# Patient Record
Sex: FEMALE | Race: WHITE | NOT HISPANIC OR LATINO | Employment: OTHER | URBAN - METROPOLITAN AREA
[De-identification: names, ages, dates, MRNs, and addresses within clinical notes are randomized per-mention and may not be internally consistent; named-entity substitution may affect disease eponyms.]

---

## 2022-06-04 ENCOUNTER — TELEPHONE ENCOUNTER (OUTPATIENT)
Dept: URBAN - METROPOLITAN AREA CLINIC 68 | Facility: CLINIC | Age: 77
End: 2022-06-04

## 2022-06-05 ENCOUNTER — TELEPHONE ENCOUNTER (OUTPATIENT)
Dept: URBAN - METROPOLITAN AREA CLINIC 68 | Facility: CLINIC | Age: 77
End: 2022-06-05

## 2022-06-05 RX ORDER — HYDROCORTISONE ACETATE 0.5 %
GLUCOSAMINE CHONDR 1500 COMPLX(  ORAL  DAILY ) ACTIVE -HX ENTRY CREAM (GRAM) TOPICAL DAILY
Status: ACTIVE | COMMUNITY
Start: 2012-12-03

## 2022-06-05 RX ORDER — BIOTIN 5 MG
BIOTIN 5000( 5MG ORAL  DAILY ) ACTIVE -HX ENTRY CAPSULE ORAL DAILY
Status: ACTIVE | COMMUNITY
Start: 2012-12-03

## 2022-06-05 RX ORDER — ACETAMINOPHEN 160 MG/5ML
CRANBERRY EXTRACT( 250MG ORAL  DAILY ) ACTIVE -HX ENTRY SUSPENSION, ORAL (FINAL DOSE FORM) ORAL DAILY
Status: ACTIVE | COMMUNITY
Start: 2012-12-03

## 2022-06-05 RX ORDER — UBIDECARENONE/VIT E ACET 100MG-5
COQ10( 100MG ORAL  DAILY ) ACTIVE -HX ENTRY CAPSULE ORAL DAILY
Status: ACTIVE | COMMUNITY
Start: 2012-12-03

## 2022-06-05 RX ORDER — MULTIVIT-MIN/FOLIC/VIT K/LYCOP 400-300MCG
VITAMIN C( 1000MG ORAL  DAILY ) ACTIVE -HX ENTRY TABLET ORAL DAILY
Status: ACTIVE | COMMUNITY
Start: 2012-12-03

## 2022-06-05 RX ORDER — POLYETHYLENE GLYCOL 3350, SODIUM SULFATE, SODIUM CHLORIDE, POTASSIUM CHLORIDE, ASCORBIC ACID, SODIUM ASCORBATE 7.5-2.691G
KIT ORAL
Qty: 1 | Refills: 0 | Status: ACTIVE | COMMUNITY
Start: 2012-12-03

## 2022-06-05 RX ORDER — MULTIVITAMIN
MULTIPLE VITAMIN(  ORAL  DAILY ) ACTIVE -HX ENTRY TABLET ORAL DAILY
Status: ACTIVE | COMMUNITY
Start: 2012-12-03

## 2022-06-05 RX ORDER — ATENOLOL 25 MG/1
TENORMIN( 25MG ORAL  DAILY ) ACTIVE -HX ENTRY TABLET ORAL DAILY
Status: ACTIVE | COMMUNITY
Start: 2012-12-03

## 2022-06-25 ENCOUNTER — TELEPHONE ENCOUNTER (OUTPATIENT)
Age: 77
End: 2022-06-25

## 2022-06-26 ENCOUNTER — TELEPHONE ENCOUNTER (OUTPATIENT)
Age: 77
End: 2022-06-26

## 2022-06-26 RX ORDER — ATENOLOL 25 MG/1
TENORMIN( 25MG ORAL  DAILY ) ACTIVE -HX ENTRY TABLET ORAL DAILY
Status: ACTIVE | COMMUNITY
Start: 2012-12-03

## 2022-06-26 RX ORDER — ACETAMINOPHEN 160 MG/5ML
CRANBERRY EXTRACT( 250MG ORAL  DAILY ) ACTIVE -HX ENTRY SUSPENSION, ORAL (FINAL DOSE FORM) ORAL DAILY
Status: ACTIVE | COMMUNITY
Start: 2012-12-03

## 2022-06-26 RX ORDER — UBIDECARENONE/VIT E ACET 100MG-5
COQ10( 100MG ORAL  DAILY ) ACTIVE -HX ENTRY CAPSULE ORAL DAILY
Status: ACTIVE | COMMUNITY
Start: 2012-12-03

## 2022-06-26 RX ORDER — POLYETHYLENE GLYCOL 3350, SODIUM SULFATE, SODIUM CHLORIDE, POTASSIUM CHLORIDE, ASCORBIC ACID, SODIUM ASCORBATE 7.5-2.691G
KIT ORAL
Qty: 1 | Refills: 0 | Status: ACTIVE | COMMUNITY
Start: 2012-12-03

## 2022-06-26 RX ORDER — MULTIVIT-MIN/FOLIC/VIT K/LYCOP 400-300MCG
VITAMIN C( 1000MG ORAL  DAILY ) ACTIVE -HX ENTRY TABLET ORAL DAILY
Status: ACTIVE | COMMUNITY
Start: 2012-12-03

## 2022-06-26 RX ORDER — FLUPHENAZINE HCL 1 MG
GLUCOSAMINE CHONDR 1500 COMPLX(  ORAL  DAILY ) ACTIVE -HX ENTRY TABLET ORAL DAILY
Status: ACTIVE | COMMUNITY
Start: 2012-12-03

## 2022-06-26 RX ORDER — CALCIUM CARBONATE/VITAMIN D3 600 MG-10
CALCIUM + D( 150-261-330MG-MG-IU ORAL  DAILY ) ACTIVE -HX ENTRY TABLET ORAL DAILY
Status: ACTIVE | COMMUNITY
Start: 2012-12-03

## 2023-10-04 ENCOUNTER — WEB ENCOUNTER (OUTPATIENT)
Dept: URBAN - METROPOLITAN AREA CLINIC 68 | Facility: CLINIC | Age: 78
End: 2023-10-04

## 2023-10-05 ENCOUNTER — LAB OUTSIDE AN ENCOUNTER (OUTPATIENT)
Dept: URBAN - METROPOLITAN AREA CLINIC 66 | Facility: CLINIC | Age: 78
End: 2023-10-05

## 2023-10-05 ENCOUNTER — WEB ENCOUNTER (OUTPATIENT)
Dept: URBAN - METROPOLITAN AREA CLINIC 66 | Facility: CLINIC | Age: 78
End: 2023-10-05

## 2023-10-05 ENCOUNTER — OFFICE VISIT (OUTPATIENT)
Dept: URBAN - METROPOLITAN AREA CLINIC 66 | Facility: CLINIC | Age: 78
End: 2023-10-05
Payer: MEDICARE

## 2023-10-05 VITALS
OXYGEN SATURATION: 97 % | WEIGHT: 130 LBS | HEART RATE: 78 BPM | HEIGHT: 65 IN | BODY MASS INDEX: 21.66 KG/M2 | DIASTOLIC BLOOD PRESSURE: 82 MMHG | SYSTOLIC BLOOD PRESSURE: 122 MMHG

## 2023-10-05 DIAGNOSIS — K21.9 GASTROESOPHAGEAL REFLUX DISEASE WITHOUT ESOPHAGITIS: ICD-10-CM

## 2023-10-05 DIAGNOSIS — Z12.11 COLON CANCER SCREENING: ICD-10-CM

## 2023-10-05 DIAGNOSIS — R10.13 DYSPEPSIA: ICD-10-CM

## 2023-10-05 DIAGNOSIS — Z86.010 HISTORY OF COLON POLYPS: ICD-10-CM

## 2023-10-05 PROBLEM — 305058001: Status: ACTIVE | Noted: 2023-10-05

## 2023-10-05 PROCEDURE — 99204 OFFICE O/P NEW MOD 45 MIN: CPT | Performed by: INTERNAL MEDICINE

## 2023-10-05 RX ORDER — ATENOLOL 25 MG/1
TENORMIN( 25MG ORAL  DAILY ) ACTIVE -HX ENTRY TABLET ORAL DAILY
Status: DISCONTINUED | COMMUNITY
Start: 2012-12-03

## 2023-10-05 RX ORDER — POLYETHYLENE GLYCOL 3350, SODIUM SULFATE, SODIUM CHLORIDE, POTASSIUM CHLORIDE, ASCORBIC ACID, SODIUM ASCORBATE 7.5-2.691G
KIT ORAL
Qty: 1 | Refills: 0 | Status: DISCONTINUED | COMMUNITY
Start: 2012-12-03

## 2023-10-05 RX ORDER — UBIDECARENONE/VIT E ACET 100MG-5
COQ10( 100MG ORAL  DAILY ) ACTIVE -HX ENTRY CAPSULE ORAL DAILY
Status: ACTIVE | COMMUNITY
Start: 2012-12-03

## 2023-10-05 RX ORDER — CALCIUM CARBONATE/VITAMIN D3 600 MG-10
CALCIUM + D( 150-261-330MG-MG-IU ORAL  DAILY ) ACTIVE -HX ENTRY TABLET ORAL DAILY
Status: DISCONTINUED | COMMUNITY
Start: 2012-12-03

## 2023-10-05 RX ORDER — SOD SULF/POT CHLORIDE/MAG SULF 1.479 G
12 TABLETS THE FIRST DOSE THE EVENING BEFORE AND SECOND DOSE THE MORNING OF COLONOSCOPY TABLET ORAL TWICE A DAY
Qty: 24 | OUTPATIENT
Start: 2023-10-05 | End: 2023-10-06

## 2023-10-05 RX ORDER — FLUPHENAZINE HCL 1 MG
GLUCOSAMINE CHONDR 1500 COMPLX(  ORAL  DAILY ) ACTIVE -HX ENTRY TABLET ORAL DAILY
Status: ACTIVE | COMMUNITY
Start: 2012-12-03

## 2023-10-05 RX ORDER — ACETAMINOPHEN 160 MG/5ML
CRANBERRY EXTRACT( 250MG ORAL  DAILY ) ACTIVE -HX ENTRY SUSPENSION, ORAL (FINAL DOSE FORM) ORAL DAILY
Status: ACTIVE | COMMUNITY
Start: 2012-12-03

## 2023-10-05 RX ORDER — MULTIVIT-MIN/FOLIC/VIT K/LYCOP 400-300MCG
VITAMIN C( 1000MG ORAL  DAILY ) ACTIVE -HX ENTRY TABLET ORAL DAILY
Status: ACTIVE | COMMUNITY
Start: 2012-12-03

## 2023-10-05 RX ORDER — ASCORBIC ACID 1000 MG
1 CAPSULE TABLET ORAL ONCE A DAY
Status: ACTIVE | COMMUNITY

## 2023-10-05 NOTE — HPI-TODAY'S VISIT:
78 y.o. WF with a history of GERD and a personal history of colon polyps who is here for evaluation of dyspepsia and reflux. She did have an EGD in 2008 with Dr. Mercer which showed antritis and a colonoscopy in 2012 which showed IH. She has episodes of reflux, dyspepsia and atypical Chest pain throughout the years and has managed with Nexium 20mg to 40 mg/d. She denies any dysphagia. She did have  negative Cologuard in 2019 and 2021. She does have a personal history of colon polyps.

## 2023-10-17 ENCOUNTER — WEB ENCOUNTER (OUTPATIENT)
Dept: URBAN - METROPOLITAN AREA CLINIC 68 | Facility: CLINIC | Age: 78
End: 2023-10-17

## 2023-12-18 ENCOUNTER — CLAIMS CREATED FROM THE CLAIM WINDOW (OUTPATIENT)
Dept: URBAN - METROPOLITAN AREA SURGERY CENTER 12 | Facility: SURGERY CENTER | Age: 78
End: 2023-12-18
Payer: MEDICARE

## 2023-12-18 ENCOUNTER — CLAIMS CREATED FROM THE CLAIM WINDOW (OUTPATIENT)
Dept: URBAN - METROPOLITAN AREA CLINIC 4 | Facility: CLINIC | Age: 78
End: 2023-12-18
Payer: MEDICARE

## 2023-12-18 DIAGNOSIS — K64.1 SECOND DEGREE HEMORRHOIDS: ICD-10-CM

## 2023-12-18 DIAGNOSIS — Z12.11 ENCOUNTER FOR SCREENING FOR MALIGNANT NEOPLASM OF COLON: ICD-10-CM

## 2023-12-18 DIAGNOSIS — K64.8 OTHER HEMORRHOIDS: ICD-10-CM

## 2023-12-18 DIAGNOSIS — Z12.11 COLON CANCER SCREENING: ICD-10-CM

## 2023-12-18 DIAGNOSIS — K57.30 DIVERTICULOSIS OF LARGE INTESTINE WITHOUT PERFORATION OR ABSCESS WITHOUT BLEEDING: ICD-10-CM

## 2023-12-18 DIAGNOSIS — K63.5 POLYP OF ASCENDING COLON, UNSPECIFIED TYPE: ICD-10-CM

## 2023-12-18 DIAGNOSIS — K57.30 COLON, DIVERTICULOSIS: ICD-10-CM

## 2023-12-18 DIAGNOSIS — K44.9 DIAPHRAGMATIC HERNIA WITHOUT OBSTRUCTION OR GANGRENE: ICD-10-CM

## 2023-12-18 DIAGNOSIS — K31.89 OTHER DISEASES OF STOMACH AND DUODENUM: ICD-10-CM

## 2023-12-18 DIAGNOSIS — K63.5 BENIGN COLON POLYPS: ICD-10-CM

## 2023-12-18 PROCEDURE — 00811 ANES LWR INTST NDSC NOS: CPT | Performed by: NURSE ANESTHETIST, CERTIFIED REGISTERED

## 2023-12-18 PROCEDURE — 88305 TISSUE EXAM BY PATHOLOGIST: CPT | Performed by: PATHOLOGY

## 2023-12-18 PROCEDURE — 45385 COLONOSCOPY W/LESION REMOVAL: CPT | Performed by: INTERNAL MEDICINE

## 2023-12-18 PROCEDURE — 43239 EGD BIOPSY SINGLE/MULTIPLE: CPT | Performed by: CLINIC/CENTER

## 2023-12-18 PROCEDURE — 43239 EGD BIOPSY SINGLE/MULTIPLE: CPT | Performed by: INTERNAL MEDICINE

## 2023-12-18 PROCEDURE — 45385 COLONOSCOPY W/LESION REMOVAL: CPT | Performed by: CLINIC/CENTER

## 2023-12-18 PROCEDURE — 88312 SPECIAL STAINS GROUP 1: CPT | Performed by: PATHOLOGY

## 2023-12-18 RX ORDER — ASCORBIC ACID 1000 MG
1 CAPSULE TABLET ORAL ONCE A DAY
Status: ACTIVE | COMMUNITY

## 2023-12-18 RX ORDER — ACETAMINOPHEN 160 MG/5ML
CRANBERRY EXTRACT( 250MG ORAL  DAILY ) ACTIVE -HX ENTRY SUSPENSION, ORAL (FINAL DOSE FORM) ORAL DAILY
Status: ACTIVE | COMMUNITY
Start: 2012-12-03

## 2023-12-18 RX ORDER — UBIDECARENONE/VIT E ACET 100MG-5
COQ10( 100MG ORAL  DAILY ) ACTIVE -HX ENTRY CAPSULE ORAL DAILY
Status: ACTIVE | COMMUNITY
Start: 2012-12-03

## 2023-12-18 RX ORDER — MULTIVIT-MIN/FOLIC/VIT K/LYCOP 400-300MCG
VITAMIN C( 1000MG ORAL  DAILY ) ACTIVE -HX ENTRY TABLET ORAL DAILY
Status: ACTIVE | COMMUNITY
Start: 2012-12-03

## 2023-12-18 RX ORDER — FLUPHENAZINE HCL 1 MG
GLUCOSAMINE CHONDR 1500 COMPLX(  ORAL  DAILY ) ACTIVE -HX ENTRY TABLET ORAL DAILY
Status: ACTIVE | COMMUNITY
Start: 2012-12-03

## 2023-12-30 PROBLEM — 428283002: Status: ACTIVE | Noted: 2023-10-05

## 2023-12-30 PROBLEM — 266435005: Status: ACTIVE | Noted: 2023-10-05

## 2024-01-03 ENCOUNTER — OFFICE VISIT (OUTPATIENT)
Dept: URBAN - METROPOLITAN AREA CLINIC 66 | Facility: CLINIC | Age: 79
End: 2024-01-03

## 2024-01-03 ENCOUNTER — OFFICE VISIT (OUTPATIENT)
Dept: URBAN - METROPOLITAN AREA CLINIC 68 | Facility: CLINIC | Age: 79
End: 2024-01-03
Payer: MEDICARE

## 2024-01-03 ENCOUNTER — DASHBOARD ENCOUNTERS (OUTPATIENT)
Age: 79
End: 2024-01-03

## 2024-01-03 VITALS
HEIGHT: 65 IN | BODY MASS INDEX: 21.66 KG/M2 | HEART RATE: 67 BPM | DIASTOLIC BLOOD PRESSURE: 80 MMHG | WEIGHT: 130 LBS | OXYGEN SATURATION: 99 % | TEMPERATURE: 97.8 F | SYSTOLIC BLOOD PRESSURE: 122 MMHG

## 2024-01-03 DIAGNOSIS — Z86.010 HISTORY OF COLON POLYPS: ICD-10-CM

## 2024-01-03 DIAGNOSIS — K21.9 GASTROESOPHAGEAL REFLUX DISEASE WITHOUT ESOPHAGITIS: ICD-10-CM

## 2024-01-03 DIAGNOSIS — R10.13 DYSPEPSIA: ICD-10-CM

## 2024-01-03 PROBLEM — 162031009: Status: ACTIVE | Noted: 2023-10-05

## 2024-01-03 PROCEDURE — 99213 OFFICE O/P EST LOW 20 MIN: CPT

## 2024-01-03 RX ORDER — FLUPHENAZINE HCL 1 MG
GLUCOSAMINE CHONDR 1500 COMPLX(  ORAL  DAILY ) ACTIVE -HX ENTRY TABLET ORAL DAILY
Status: ACTIVE | COMMUNITY
Start: 2012-12-03

## 2024-01-03 RX ORDER — UBIDECARENONE/VIT E ACET 100MG-5
COQ10( 100MG ORAL  DAILY ) ACTIVE -HX ENTRY CAPSULE ORAL DAILY
Status: ACTIVE | COMMUNITY
Start: 2012-12-03

## 2024-01-03 RX ORDER — ACETAMINOPHEN 160 MG/5ML
CRANBERRY EXTRACT( 250MG ORAL  DAILY ) ACTIVE -HX ENTRY SUSPENSION, ORAL (FINAL DOSE FORM) ORAL DAILY
Status: ACTIVE | COMMUNITY
Start: 2012-12-03

## 2024-01-03 RX ORDER — ASCORBIC ACID 1000 MG
1 CAPSULE TABLET ORAL ONCE A DAY
Status: ACTIVE | COMMUNITY

## 2024-01-03 RX ORDER — MULTIVIT-MIN/FOLIC/VIT K/LYCOP 400-300MCG
VITAMIN C( 1000MG ORAL  DAILY ) ACTIVE -HX ENTRY TABLET ORAL DAILY
Status: ACTIVE | COMMUNITY
Start: 2012-12-03

## 2024-01-03 NOTE — HPI-TODAY'S VISIT:
78 y.o. WF with a history of GERD and a personal history of colon polyps who is here for follow-up s/p EGD and colonoscopy on 12/18/23 found with 2 cm hiatal hernia, gastritis; 2 colon polyps, diverticulosis, IH. Pathology negative. She is feeling well and denies heartburn, reflux. She has episodes of reflux, dyspepsia and atypical chest pain throughout the years and has managed with Nexium 20mg to 40 mg/d. She denies any dysphagia. She did have negative Cologuard in 2019 and 2021. She does have a personal history of colon polyps. She is here today for follow-up and has discontinued Nexium a couple weeks ago.  She is asymptomatic at this time.  She denies nausea/vomiting, dysphagia, abdominal pain, melena, diarrhea, constipation, rectal bleeding, weight loss, fever.